# Patient Record
Sex: FEMALE
[De-identification: names, ages, dates, MRNs, and addresses within clinical notes are randomized per-mention and may not be internally consistent; named-entity substitution may affect disease eponyms.]

---

## 2022-07-15 ENCOUNTER — APPOINTMENT (OUTPATIENT)
Dept: ORTHOPEDIC SURGERY | Facility: CLINIC | Age: 42
End: 2022-07-15

## 2022-07-15 VITALS — WEIGHT: 157 LBS | HEIGHT: 59 IN | BODY MASS INDEX: 31.65 KG/M2 | RESPIRATION RATE: 16 BRPM

## 2022-07-15 DIAGNOSIS — Z78.9 OTHER SPECIFIED HEALTH STATUS: ICD-10-CM

## 2022-07-15 DIAGNOSIS — M65.4 RADIAL STYLOID TENOSYNOVITIS [DE QUERVAIN]: ICD-10-CM

## 2022-07-15 PROBLEM — Z00.00 ENCOUNTER FOR PREVENTIVE HEALTH EXAMINATION: Status: ACTIVE | Noted: 2022-07-15

## 2022-07-15 PROCEDURE — 20550 NJX 1 TENDON SHEATH/LIGAMENT: CPT | Mod: RT

## 2022-07-15 PROCEDURE — 99203 OFFICE O/P NEW LOW 30 MIN: CPT | Mod: 25

## 2022-07-15 PROCEDURE — 73110 X-RAY EXAM OF WRIST: CPT | Mod: 50

## 2022-07-15 RX ORDER — NAPROXEN 500 MG/1
500 TABLET ORAL
Qty: 20 | Refills: 0 | Status: ACTIVE | COMMUNITY
Start: 2022-06-24

## 2023-06-21 ENCOUNTER — APPOINTMENT (OUTPATIENT)
Dept: ORTHOPEDIC SURGERY | Facility: CLINIC | Age: 43
End: 2023-06-21
Payer: COMMERCIAL

## 2023-06-21 VITALS — BODY MASS INDEX: 31.85 KG/M2 | WEIGHT: 158 LBS | HEIGHT: 59 IN

## 2023-06-21 PROCEDURE — 99203 OFFICE O/P NEW LOW 30 MIN: CPT

## 2023-06-21 PROCEDURE — L4360 PNEUMAT WALKING BOOT PRE CST: CPT | Mod: LT

## 2023-06-21 RX ORDER — IBUPROFEN 600 MG/1
600 TABLET, FILM COATED ORAL
Qty: 60 | Refills: 1 | Status: ACTIVE | COMMUNITY
Start: 2023-06-21 | End: 1900-01-01

## 2023-06-21 NOTE — DISCUSSION/SUMMARY
[de-identified] : Impression: Left avulsion fracture at the distal end of the fibula\par \par Plan: Patient was advised for immobilization of the left ankle with a cam walker boot which was placed in the office.\par Instruction of using boot was given.\par Patient was advised for the use of crutches, patient was advised that this injury can be a weightbearing fracture, but since she has significant amount of pain, she can continue with crutches and when tolerated she can transition to protected weightbearing with crutches.\par Patient was advised for ice therapy to help with the pain and swelling.\par Patient was advised for elevation, prescription for ibuprofen 600 was sent to the pharmacy\par \par Follow-up: 2 weeks for repeat evaluation and repeat x-rays.\par \par

## 2023-06-21 NOTE — IMAGING
[de-identified] : On examination of the left ankle, patient has soft tissue swelling over the lateral malleolus, patient has decreased range of motion to dorsiflexion plantarflexion possibly due to pain and swelling.\par Patient is able to dorsiflex to neutral.\par Calf is soft, nontender.\par Patient has nontender when palpating over the medial malleolus, nontender over the deltoid ligament.\par Nontender the lateral ligaments.\par Patient has tenderness over patient over the distal fibula\par Neurovascular intact.\par \par X-ray of the left ankle we will provide the patient in a CD, these x-ray was saved in our system, x-ray shows a nondisplaced fracture at the distal end of the fibula.

## 2023-06-21 NOTE — HISTORY OF PRESENT ILLNESS
[de-identified] : 42-year-old female here for an evaluation of injury sustained to the left ankle, patient states that the senior happened on June 21, 2023, patient states that she was walking when she twisted her ankle, she states that she felt a pop and she had significant pain, patient was seen at an urgent center, where x-rays were done, she was advised for fracture, she was a splinted and advised to follow-up with orthopedic.\par Patient unable to apply weight over the left ankle.\par Patient states that her pain is 8/10 at rest and 10/10 with activity.\par \par Patient denies any medical problems.\par Denies any medications.\par Patient admits to allergy to sulfa.

## 2023-06-30 ENCOUNTER — TRANSCRIPTION ENCOUNTER (OUTPATIENT)
Age: 43
End: 2023-06-30

## 2023-07-05 ENCOUNTER — APPOINTMENT (OUTPATIENT)
Dept: ORTHOPEDIC SURGERY | Facility: CLINIC | Age: 43
End: 2023-07-05
Payer: COMMERCIAL

## 2023-07-05 ENCOUNTER — RESULT CHARGE (OUTPATIENT)
Age: 43
End: 2023-07-05

## 2023-07-05 VITALS — BODY MASS INDEX: 31.85 KG/M2 | WEIGHT: 158 LBS | HEIGHT: 59 IN

## 2023-07-05 PROCEDURE — L1902: CPT | Mod: LT

## 2023-07-05 PROCEDURE — 28470 CLTX METATARSAL FX WO MNP EA: CPT | Mod: LT

## 2023-07-05 PROCEDURE — 73610 X-RAY EXAM OF ANKLE: CPT | Mod: LT

## 2023-07-05 PROCEDURE — 99213 OFFICE O/P EST LOW 20 MIN: CPT | Mod: 57

## 2023-07-05 NOTE — DISCUSSION/SUMMARY
[de-identified] : I encouraged the patient to begin weightbearing with the boot on.  She can transition to a lace up ankle brace in 2 weeks.  I will see her back in 1 month.  We will initiate fracture care at this point.  All questions answered.

## 2023-07-05 NOTE — DATA REVIEWED
[FreeTextEntry1] : 3 views of the patient's left ankle ordered and reviewed by me personally.  The x-rays demonstrate a consolidating avulsion fracture of the lateral malleolus.  There is no interval displacement.  The ankle joint is congruent.

## 2023-07-05 NOTE — PHYSICAL EXAM
[de-identified] : She is alert oriented x3.  She is pleasant cooperative.  Examination of the left lower extremity was undertaken.  She is swollen.  She is tender palpation at the tip of the fibula.  She demonstrates grossly intact range of motion.  Nontender elsewhere.  Neurovascular intact distally.

## 2023-08-09 ENCOUNTER — APPOINTMENT (OUTPATIENT)
Dept: ORTHOPEDIC SURGERY | Facility: CLINIC | Age: 43
End: 2023-08-09
Payer: COMMERCIAL

## 2023-08-09 DIAGNOSIS — S82.832A OTHER FRACTURE OF UPPER AND LOWER END OF LEFT FIBULA, INITIAL ENCOUNTER FOR CLOSED FRACTURE: ICD-10-CM

## 2023-08-09 PROCEDURE — 73610 X-RAY EXAM OF ANKLE: CPT | Mod: LT

## 2023-08-09 PROCEDURE — 99024 POSTOP FOLLOW-UP VISIT: CPT

## 2023-08-09 NOTE — DATA REVIEWED
[FreeTextEntry1] : 3 views of the patient's left ankle ordered and reviewed by me personally.  They demonstrate a nondisplaced healing avulsion fracture of the distal fibula.

## 2023-08-09 NOTE — DISCUSSION/SUMMARY
[de-identified] : Patient is now 7 weeks out from her injury.  She can slowly return back to activity as tolerated.  She can discontinue the brace fully.  I will see her back as needed.

## 2023-08-09 NOTE — HISTORY OF PRESENT ILLNESS
[de-identified] : This is a pleasant 42-year-old patient who comes in today for follow-up of her left ankle distal fibular fracture which we are treating nonsurgically with fracture care.  She is doing well.  She has been walking around.  She has been able to transition out of the boot into a brace and even today without the brace.  Her pain is improving.  Does not endorse any other symptoms.

## 2023-08-09 NOTE — PHYSICAL EXAM
[de-identified] : She is minimally tender at the distal fibula.  There is grossly intact range of motion.  Her compartments are soft compressible and she is neurovascular intact.

## 2024-08-13 ENCOUNTER — APPOINTMENT (OUTPATIENT)
Dept: ORTHOPEDIC SURGERY | Facility: CLINIC | Age: 44
End: 2024-08-13
Payer: COMMERCIAL

## 2024-08-13 VITALS — WEIGHT: 165 LBS | BODY MASS INDEX: 33.26 KG/M2 | HEIGHT: 59 IN

## 2024-08-13 DIAGNOSIS — S62.111A DISPLACED FRACTURE OF TRIQUETRUM [CUNEIFORM] BONE, RIGHT WRIST, INITIAL ENCOUNTER FOR CLOSED FRACTURE: ICD-10-CM

## 2024-08-13 PROCEDURE — 99214 OFFICE O/P EST MOD 30 MIN: CPT

## 2024-08-13 NOTE — ASSESSMENT
[FreeTextEntry1] : 43-year-old female here for evaluation of right wrist pain.  Patient reports she fell on outstretched right hand and wrist few days ago.  [facility] was [facility] where x-[facility] taken which confirmed acute closed place fracture of the triquetrum of the right wrist.  She was placed in a splint and advised to follow-up in orthopedic specialist.  Physical examination of right wrist: Mild swelling appreciated laterally greater than medially.  No ecchymosis or erythema appreciated.  Skin is intact.  Tense palpation of the triquetrum.  No point tenderness over the scaphoid or metacarpals.  No point tenderness over the right distal radius.  Good range of motion despite pain and swelling.  Sensation is intact distally.  Neuro vas intact.  X-rays of the right wrist were downloaded from [facility] into our PACS system and reveal an acute closed displaced fracture of the triquetrum of the right wrist.  No subluxations or dislocations.  Patient has an acute closed displaced fracture of the triquetrum right wrist.  X-rays were discussed in depth.  Placed patient in a cock up wrist brace in the office today that which she can utilize at all times.  She is to wear it like a cast.  Advised no gym or sports.  Advised no heavy lifting, pushing, or pulling.  She expresses full understanding.  She can take anti-inflammatory medication as needed for pain.  Red flag symptoms were discussed.  She understands fracture take a total of 4 to 6 weeks to fully heal. All questions and concerns addressed to patient's satisfaction. Patient expresses full understanding of treatment plan.

## 2024-09-05 ENCOUNTER — APPOINTMENT (OUTPATIENT)
Dept: ORTHOPEDIC SURGERY | Facility: CLINIC | Age: 44
End: 2024-09-05

## 2024-09-05 DIAGNOSIS — S62.111A DISPLACED FRACTURE OF TRIQUETRUM [CUNEIFORM] BONE, RIGHT WRIST, INITIAL ENCOUNTER FOR CLOSED FRACTURE: ICD-10-CM

## 2024-09-05 PROCEDURE — 99212 OFFICE O/P EST SF 10 MIN: CPT | Mod: 25

## 2024-09-05 PROCEDURE — 73110 X-RAY EXAM OF WRIST: CPT | Mod: RT

## 2024-09-05 NOTE — ASSESSMENT
[FreeTextEntry1] : 43-year-old female here for evaluation of right wrist pain.  Patient reports she fell on outstretched right hand and wrist few weeks ago.  She was seen at [facility] where x-rays taken which confirmed acute closed place fracture of the triquetrum of the right wrist.  She has been utilizing a cock-up wrist brace.  She reports her pain is well-controlled and improving.  Physical examination of right wrist: Mild swelling appreciated laterally greater than medially.  No ecchymosis or erythema appreciated.  Skin is intact.  Mildly tender to palpation of the triquetrum.  No point tenderness over the scaphoid or metacarpals.  No point tenderness over the right distal radius.  Good range of motion despite pain and swelling.  Sensation is intact distally.  Neuro vas intact.  X-rays of the right wrist taken in the office today reveal an acute closed displaced fracture of the triquetrum of the right wrist.  No subluxations or dislocations.  No change in alignment of fracture pattern.  Patient has an acute closed displaced fracture of the triquetrum right wrist.  X-rays were discussed in depth.  Placed patient in a cock up wrist brace in the office today that which she can utilize at all times.  She is to wear it like a cast.  Advised no gym or sports.  Advised no heavy lifting, pushing, or pulling.  She expresses full understanding.  She can take anti-inflammatory medication as needed for pain.  Red flag symptoms were discussed.  She understands fracture take a total of 4 to 6 weeks to fully heal. All questions and concerns addressed to patient's satisfaction. Patient expresses full understanding of treatment plan.  I will see her back in 3 weeks for final x-rays and evaluation.

## 2024-09-26 ENCOUNTER — APPOINTMENT (OUTPATIENT)
Dept: ORTHOPEDIC SURGERY | Facility: CLINIC | Age: 44
End: 2024-09-26
Payer: COMMERCIAL

## 2024-09-26 DIAGNOSIS — S62.111A DISPLACED FRACTURE OF TRIQUETRUM [CUNEIFORM] BONE, RIGHT WRIST, INITIAL ENCOUNTER FOR CLOSED FRACTURE: ICD-10-CM

## 2024-09-26 PROCEDURE — 99213 OFFICE O/P EST LOW 20 MIN: CPT

## 2024-09-26 PROCEDURE — 73110 X-RAY EXAM OF WRIST: CPT | Mod: RT

## 2024-09-26 NOTE — ASSESSMENT
[FreeTextEntry1] : 43-year-old female here for evaluation of right wrist pain.  Patient reports she fell on outstretched right hand and wrist few weeks ago.  She was seen at [facility] where x-rays taken which confirmed acute closed place fracture of the triquetrum of the right wrist.  She has been utilizing a cock-up wrist brace.  She reports her pain is well-controlled and improving.  Physical examination of right wrist: Mild swelling appreciated laterally greater than medially.  No ecchymosis or erythema appreciated.  Skin is intact.  Mildly tender to palpation of the triquetrum.  No point tenderness over the scaphoid or metacarpals.  No point tenderness over the right distal radius.  Good range of motion despite pain and swelling.  Sensation is intact distally.  Neuro vas intact.  X-rays of the right wrist taken in the office today reveal an acute closed displaced fracture of the triquetrum of the right wrist.  No subluxations or dislocations.  No change in alignment of fracture pattern.  Patient has an acute closed displaced fracture of the triquetrum right wrist.  The patient is doing well overall.  Patient  has been utilizing her cock-up wrist brace that which she can discontinue moving forwards as she has good range of motion, and her pain is well-controlled.  X-rays were discussed in depth and reveal acceptable alignment of fracture pattern with routine healing.  The p atient may return to normal activity within the limitations of pain.  I encouraged activity modification within the limitations of pain.  Risks and benefits were discussed.  Red flag symptoms were discussed. All questions and concerns addressed to patient's satisfaction. Patient expresses full understanding of treatment plan.